# Patient Record
Sex: FEMALE | Race: BLACK OR AFRICAN AMERICAN | NOT HISPANIC OR LATINO | URBAN - METROPOLITAN AREA
[De-identification: names, ages, dates, MRNs, and addresses within clinical notes are randomized per-mention and may not be internally consistent; named-entity substitution may affect disease eponyms.]

---

## 2025-01-01 ENCOUNTER — HOSPITAL ENCOUNTER (EMERGENCY)
Facility: HOSPITAL | Age: 45
End: 2025-08-09
Attending: EMERGENCY MEDICINE

## 2025-01-01 VITALS
SYSTOLIC BLOOD PRESSURE: 99 MMHG | DIASTOLIC BLOOD PRESSURE: 46 MMHG | OXYGEN SATURATION: 84 % | RESPIRATION RATE: 20 BRPM

## 2025-01-01 DIAGNOSIS — I46.9 CARDIAC ARREST: Primary | ICD-10-CM

## 2025-01-01 LAB
ANION GAP BLDV CALCULATED.4IONS-SCNC: 19 MMOL/L (ref 10–25)
ANION GAP BLDV CALCULATED.4IONS-SCNC: 20 MMOL/L (ref 10–25)
ANION GAP BLDV CALCULATED.4IONS-SCNC: 23 MMOL/L (ref 10–25)
BASE EXCESS BLDV CALC-SCNC: -14.3 MMOL/L (ref -2–3)
BASE EXCESS BLDV CALC-SCNC: -15.4 MMOL/L (ref -2–3)
BASE EXCESS BLDV CALC-SCNC: -18.5 MMOL/L (ref -2–3)
BODY TEMPERATURE: 37 DEGREES CELSIUS
CA-I BLDV-SCNC: 1.26 MMOL/L (ref 1.1–1.33)
CA-I BLDV-SCNC: 1.75 MMOL/L (ref 1.1–1.33)
CA-I BLDV-SCNC: 2.55 MMOL/L (ref 1.1–1.33)
CHLORIDE BLDV-SCNC: 104 MMOL/L (ref 98–107)
CHLORIDE BLDV-SCNC: 107 MMOL/L (ref 98–107)
CHLORIDE BLDV-SCNC: 109 MMOL/L (ref 98–107)
GLUCOSE BLDV-MCNC: 203 MG/DL (ref 74–99)
GLUCOSE BLDV-MCNC: 216 MG/DL (ref 74–99)
GLUCOSE BLDV-MCNC: 242 MG/DL (ref 74–99)
HCO3 BLDV-SCNC: 14.4 MMOL/L (ref 22–26)
HCO3 BLDV-SCNC: 16 MMOL/L (ref 22–26)
HCO3 BLDV-SCNC: 17.9 MMOL/L (ref 22–26)
HCT VFR BLD EST: 33 % (ref 36–46)
HCT VFR BLD EST: 35 % (ref 36–46)
HCT VFR BLD EST: 39 % (ref 36–46)
HGB BLDV-MCNC: 11.1 G/DL (ref 12–16)
HGB BLDV-MCNC: 11.5 G/DL (ref 12–16)
HGB BLDV-MCNC: 13 G/DL (ref 12–16)
LACTATE BLDV-SCNC: 6.6 MMOL/L (ref 0.4–2)
LACTATE BLDV-SCNC: 7.8 MMOL/L (ref 0.4–2)
LACTATE BLDV-SCNC: 8.5 MMOL/L (ref 0.4–2)
OXYHGB MFR BLDV: 15.8 % (ref 45–75)
OXYHGB MFR BLDV: 52.6 % (ref 45–75)
OXYHGB MFR BLDV: 9.8 % (ref 45–75)
PCO2 BLDV: 62 MM HG (ref 41–51)
PCO2 BLDV: 72 MM HG (ref 41–51)
PCO2 BLDV: 76 MM HG (ref 41–51)
PH BLDV: 6.91 PH (ref 7.33–7.43)
PH BLDV: 6.98 PH (ref 7.33–7.43)
PH BLDV: 7.02 PH (ref 7.33–7.43)
PO2 BLDV: 16 MM HG (ref 35–45)
PO2 BLDV: 25 MM HG (ref 35–45)
PO2 BLDV: 46 MM HG (ref 35–45)
POTASSIUM BLDV-SCNC: 3.4 MMOL/L (ref 3.5–5.3)
POTASSIUM BLDV-SCNC: 4.1 MMOL/L (ref 3.5–5.3)
POTASSIUM BLDV-SCNC: 6 MMOL/L (ref 3.5–5.3)
SAO2 % BLDV: 10 % (ref 45–75)
SAO2 % BLDV: 16 % (ref 45–75)
SAO2 % BLDV: 55 % (ref 45–75)
SODIUM BLDV-SCNC: 137 MMOL/L (ref 136–145)
SODIUM BLDV-SCNC: 140 MMOL/L (ref 136–145)
SODIUM BLDV-SCNC: 140 MMOL/L (ref 136–145)

## 2025-01-01 PROCEDURE — 92950 HEART/LUNG RESUSCITATION CPR: CPT | Performed by: EMERGENCY MEDICINE

## 2025-01-01 PROCEDURE — 92950 HEART/LUNG RESUSCITATION CPR: CPT

## 2025-01-01 PROCEDURE — 84132 ASSAY OF SERUM POTASSIUM: CPT

## 2025-01-01 PROCEDURE — 99284 EMERGENCY DEPT VISIT MOD MDM: CPT | Performed by: EMERGENCY MEDICINE

## 2025-01-01 PROCEDURE — 31500 INSERT EMERGENCY AIRWAY: CPT | Performed by: EMERGENCY MEDICINE

## 2025-01-01 PROCEDURE — 99285 EMERGENCY DEPT VISIT HI MDM: CPT | Performed by: EMERGENCY MEDICINE

## 2025-01-01 PROCEDURE — 2500000004 HC RX 250 GENERAL PHARMACY W/ HCPCS (ALT 636 FOR OP/ED): Performed by: EMERGENCY MEDICINE

## 2025-01-01 PROCEDURE — 99285 EMERGENCY DEPT VISIT HI MDM: CPT | Mod: 25 | Performed by: EMERGENCY MEDICINE

## 2025-01-01 RX ORDER — EPINEPHRINE IN 0.9 % SOD CHLOR 4MG/250ML
PLASTIC BAG, INJECTION (ML) INTRAVENOUS
Status: DISCONTINUED
Start: 2025-01-01 | End: 2025-08-09 | Stop reason: HOSPADM

## 2025-01-01 RX ORDER — NOREPINEPHRINE BITARTRATE/D5W 8 MG/250ML
PLASTIC BAG, INJECTION (ML) INTRAVENOUS
Status: DISCONTINUED
Start: 2025-01-01 | End: 2025-08-09 | Stop reason: HOSPADM

## 2025-01-01 RX ORDER — ESMOLOL HYDROCHLORIDE 10 MG/ML
INJECTION, SOLUTION INTRAVENOUS
Status: DISCONTINUED
Start: 2025-01-01 | End: 2025-08-09 | Stop reason: HOSPADM

## 2025-01-01 RX ORDER — ATROPINE SULFATE 0.1 MG/ML
INJECTION INTRAVENOUS CODE/TRAUMA/SEDATION MEDICATION
Status: COMPLETED | OUTPATIENT
Start: 2025-01-01 | End: 2025-01-01

## 2025-01-01 RX ORDER — CALCIUM CHLORIDE INJECTION 100 MG/ML
INJECTION, SOLUTION INTRAVENOUS
Status: DISCONTINUED
Start: 2025-01-01 | End: 2025-08-09 | Stop reason: HOSPADM

## 2025-01-01 RX ORDER — ESMOLOL HYDROCHLORIDE 10 MG/ML
50-300 INJECTION, SOLUTION INTRAVENOUS CONTINUOUS
Status: DISCONTINUED | OUTPATIENT
Start: 2025-01-01 | End: 2025-08-09

## 2025-01-01 RX ORDER — MAGNESIUM SULFATE HEPTAHYDRATE 500 MG/ML
INJECTION, SOLUTION INTRAMUSCULAR; INTRAVENOUS
Status: DISCONTINUED
Start: 2025-01-01 | End: 2025-08-09 | Stop reason: HOSPADM

## 2025-01-01 RX ORDER — EPINEPHRINE 0.1 MG/ML
INJECTION INTRACARDIAC; INTRAVENOUS CODE/TRAUMA/SEDATION MEDICATION
Status: COMPLETED | OUTPATIENT
Start: 2025-01-01 | End: 2025-01-01

## 2025-01-01 RX ADMIN — ATROPINE SULFATE INJECTION 0.1 MG: 0.1 INJECTION, SOLUTION INTRAVENOUS at 21:16

## 2025-01-01 RX ADMIN — EPINEPHRINE 1 MG: 0.1 INJECTION INTRAVENOUS at 21:15

## 2025-08-09 PROCEDURE — 31500 INSERT EMERGENCY AIRWAY: CPT

## 2025-08-09 NOTE — SIGNIFICANT EVENT
Pt came in full arrest.  Pt had igel.  Started bagging with etCO2 inline.  Difficulty inserting ETT.  Reinserted igel.  Pt initially achieved ROSC.  MD successfully intubated at that time.  Pt lost ROSC.  CPR continued for quite some time, without ROSC.  MD spoke to pt family and decision made to termine CPR.

## 2025-08-09 NOTE — ED PROVIDER NOTES
History of Present Illness   History provided by: EMS  Limitations to History: Cardiac Arrest    HPI:  Rabia Phelps is a 45 y.o. female who is brought in by EMS in cardiac arrest. Per EMS report, patient was found at home unresponsive in bed.  Last known well time over an hour prior.  No bystander CPR.  Patient received 2 mg of epinephrine in route.  LMA in place.  No external signs of trauma or drug paraphernalia found near patient    Physical Exam   General: unconscious, nonresponsive to noxious stimuli  Eyes: Pupils fixed and dilated  HENT: Normo-cephalic, atraumatic.  CV: Chest Compressions ongoing, femoral pulse palpable with compressions  Resp: Bagging through LMA with symmetric breath sounds bilaterally  GI: Soft, no bruising  MSK/Extremities: No gross bony deformities.  Skin: Dry  Neuro: GCS 3. Patient is unresponsive with no spontaneous movements.    Medical Decision Making & ED Course   Medical Decision Makin y.o. female presenting to the ED in cardiac arrest.  ACLS with supervision of CPR was continued here.  Patient was bagged with ease. Endotracheal intubation performed.  Please see procedure note for details of the intubation procedure.  ETT placement was confirmed via waveform end-tidal CO2. Please see Code Narrator for detailed dosages and timing of medication administration.  On arrival, patient transition to ED cot where patient was bagged with LMA and CPR continued.  On initial pulse check, patient in V-fib and shocked delivered.  Given that there is no bystander CPR with unknown downtime, patient is not candidate for ECMO.  Patient received 2 g of calcium and 2 g of magnesium.  Initial blood gas remarkable for elevated lactate and decreased pH less than 7.2.  Patient continued with ACLS with epinephrine calcium bicarbonate.  On subsequent pulse checks, patient varied between asystole and V-fib which she received dual sequence defibrillation for.  Esmolol drip as well as amiodarone bolus  push followed by drip initiated.  Patient obtained ROSC x 2 which lasted for approximately 2 minutes before patient lost pulses again.  Repeat gases showed continued worsening of pH and uptrending lactate despite ongoing CPR and definitive airway with bagging.  Blood gases without any obvious reversible causes of death.  Point-of-care ultrasound of the heart shows no severe right heart strain or pericardial effusion.  There is bilateral lung sliding and no evidence of fistulas.  No traumatic signs of injury.  Patient is glucose within normal limits.  Given patient's unknown downtime without bystander CPR and ongoing resuscitation greater than 1 hour with worsening VBG's and inability to maintain ROSC, discussion was had with patient's sister and brother-in-law about patient's incredibly poor prognosis.  Family verbalized understanding and agreed with termination of resuscitation.  As a result, time of death called at 2152.     Time of death called at 21: 52    ED Course:  ED Course as of 08/08/25 2213   Fri Aug 08, 2025   2158 Patient presented to the emergency room as a prehospital cardiac arrest.  Patient was found unresponsive at home, found to be in respiratory and cardiac arrest.  EMS was called, bystander CPR was not initiated.  EMS arrived, ACLS protocol was started, an IO and an Igel were placed.  Patient did have 1 episode of ventricular tachycardia prehospital, received 1 shock.  Arrived with CPR in progress.  Patient was placed on the monitor, found to have V. tach and torsades.  Received shock, ACLS protocol.  Initial blood gas remarkable for slightly elevated lactate and decreased pH to less than 7.2.  She was continued on ACLS with epinephrine, calcium, bicarbonate, received amiodarone for recurrent ventricular tachycardia and V-fib.  Due to the recurrent shockable rhythm patient was started on esmolol drip as well as dual sequence defibrillation.  She was unable to maintain ROSC despite this and  atropine was also started.  Patient ultimately was on levo, vaso, receiving epi, esmolol, amiodarone.  Repeat gases showed continued worsening of her pH as well as uptrending of her lactate.  Upon arrival the patient also had a definitive airway placed that was complicated by significant amounts of vomiting and likely aspiration.  An ET tube, 7 5 was able to be placed,: Trach confirmation as well as bilateral breath sounds.  An OG tube was placed and approximately 500 cc of gastric contents was aspirated.  Patient's repeat blood gas not show any reversible causes of the patient's cardiac arrest.  There was no external signs of trauma, bedside ultrasound showed no pericardial effusion or right ventricular dilation.  Due to the patient's prolonged downtime, lack of reversible causes, uptrending lactate despite multiple medications and prolonged CPR ROSC was not able to be maintained.  Time of death was called at 2152.  The patient's sister and brother were notified. [LP]      ED Course User Index  [LP] Rupali Burris,        Disposition       Procedures   Procedures    Hector Alvares MD  Emergency Medicine      Hector Alvares MD  Resident  [phone]

## 2025-08-09 NOTE — PROGRESS NOTES
PASTORA met with family and  police to de-escalate pt's son who was agitated. PASTORA accompanied family to bedside.     PASTORA spoke with pt's sister Mandie [phone]. Mandie requested information on next steps. PASTORA spoke with mortician who advised that he  needs the following info    Name and contact of pt's primary care physician  Name and contact information of pt's legal next of kin    PASTORA contacted Mandie and left a voicemail requesting call back to review next steps.     PASTORA contacted Mandie again to review next steps. PASTORA provided update and  Mortician's phone number.

## 2025-08-09 NOTE — ED TRIAGE NOTES
Per EMS report, pt found down upon arrival, shocked at 2048. Epi pushed x2 last at 2054, arrived with charly airway, bagging

## 2025-08-09 NOTE — ED PROCEDURE NOTE
Procedure  Intubation    Performed by: Carine Sal MD  Authorized by: Carine Sal MD    Consent:     Consent obtained:  Emergent situation  Universal protocol:     Patient identity confirmed:  Anonymous protocol, patient vented/unresponsive  Pre-procedure details:     Indications: cardio/pulmonary arrest      Patient status:  Unresponsive    Look externally: no concerns      Obstruction: none      Neck mobility: normal      Pharmacologic strategy: none      Induction agents:  None    Paralytics:  None  Procedure details:     Preoxygenation:  Supraglottic device    CPR in progress: yes      Number of attempts:  3  Successful intubation attempt details:     Intubation method:  Oral    Intubation technique: video assisted      Laryngoscope blade:  Mac 3    Bougie used: no      Tube size (mm):  7.0    Tube type:  Cuffed    Tube visualized through cords: yes    First unsuccessful intubation attempt details:     Intubation method:  Oral    Intubation technique:  Video assisted    Laryngoscope blade:  Mac 3    Bougie used: no      Grade view: III      Tube size (mm):  7.0    Tube type:  Cuffed    Ventilation between 1st and 2nd attempt: yes with extraglottic device      Tube visualized through cords: yes    Second unsuccessful intubation attempt details:     Intubation method:  Oral    Intubation technique:  Over wire (bougie)    Bougie used: yes      Tube type:  Cuffed    Ventilation between 2nd and 3rd attempt: yes with extraglottic device      Tubes visualized through cords: no    Placement assessment:     ETT at teeth/gumline (cm):  24    Tube secured with:  ETT orellana    Breath sounds:  Equal    Placement verification: chest rise, colorimetric ETCO2, direct visualization, equal breath sounds, numeric ETCO2 and waveform ETCO2    Post-procedure details:     Procedure completion:  Tolerated with difficulty    Complications: emesis and hypoxia                 Carine Sal MD  Resident  08/09/25 5638